# Patient Record
Sex: MALE | Race: WHITE
[De-identification: names, ages, dates, MRNs, and addresses within clinical notes are randomized per-mention and may not be internally consistent; named-entity substitution may affect disease eponyms.]

---

## 2017-02-01 ENCOUNTER — HOSPITAL ENCOUNTER (OUTPATIENT)
Dept: HOSPITAL 17 - CPRE | Age: 67
End: 2017-02-01
Attending: NEUROLOGICAL SURGERY
Payer: MEDICARE

## 2017-02-01 DIAGNOSIS — M12.88: ICD-10-CM

## 2017-02-01 DIAGNOSIS — M51.36: ICD-10-CM

## 2017-02-01 DIAGNOSIS — M48.06: Primary | ICD-10-CM

## 2017-02-03 NOTE — MH
cc:

CLEOPATRA BOLDEN M.D., ROHIT K. M.D. NIPPER MD,DALTON MCARTHUR M.D., MD

****

 

DATE OF ADMISSION:  2017

 

ADMITTING DIAGNOSIS:

Lumbar spinal stenosis.

 

HISTORY OF PRESENT ILLNESS

This is a 66-year-old male who presented to our office for an evaluation of

right groin pain.  He states that in 2016 he was on a trip out-of-state

and doing a lot of physical work weed whacking and twisting a lot.  He states

that two days after he returned, he had difficulty walking secondary to the

right groin pain which progressively has gotten worse. He went to the emergency

room for pain control but everything they gave him was not helping his pain. He

had an x-ray and ultrasound of his right groin and that was okay. He was

discharged with tramadol and muscle relaxants, which did not help.  He followed

up with his primary care physician was placed on prednisone, muscle relaxants

and Norco.  He states the pain resolved in July. On , the pain

returned but not to the same intensity.  His primary care doctor placed him on

prednisone again. He states the pain has changed and it is not in the groin so

much but is present in the mid right thigh to the mid right shin and around the

right knee. He states on the bottom of his feet, they feel like he is walking

on sand. He feels stuff crawling up his right calf. He denies any left lower

extremity symptoms. He denies much low back pain. Flexing his right hip

exacerbates his right groin pain. The patient has tried physical therapy which

helped some with his right groin pain. He wants to return to being a very

active lifestyle but is very concerned that the severe pain that he initially

had is going to return when he increases his activity.

The patient states that his pain is restricting his activity status.  He is

requesting that we proceed with surgical intervention.

 

PAST MEDICAL HISTORY:

1. 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

 

2. He also sees a rheumatologist for inflammatory arthritis.

3. He has a history of a torn rotator cuff on the left side in  that he had

surgery for.

4. Right thumb fracture in .

5. Tonsillectomy in .

 

 

CURRENT MEDICATIONS:

1. Arava daily.

2. Aleve daily and this was placed on hold prior to surgical intervention.

 

ALLERGIES:

HE HAS NO KNOWN DRUG ALLERGIES.

 

REVIEW OF SYSTEMS

CONSTITUTIONAL:  He denies any fever or chills.

EARS, NOSE AND THROAT:  No pharyngitis, exudates or bloody drainage from his

nose.

CARDIOVASCULAR:  He denies any chest pain or palpitations.

RESPIRATORY:  No cough or shortness of breath.

GENITOURINARY:  No dysuria or hematuria.

MUSCULOSKELETAL:  Positive for leg pain. No back pain.

SKIN:  No rashes or pruritus.

NEUROLOGIC:  No difficulty with speech or memory.

GASTROINTESTINAL:  No nausea or vomiting or abdominal pain.

PSYCHIATRIC:  No anxiety or depression symptoms.

ENDOCRINE:  No polyuria or polydipsia.

HEMATOLOGIC:  No bruising or bleeding tendencies.

 

FAMILY HISTORY:

His mother is  at 96 years of age old of natural causes. His father is

 at 81 years old and had a stroke.  He has a brother who is alive at 57

years old and in good health.

 

SOCIAL HISTORY:

He is retired.  He is .  He has two children.  He does not smoke and he

had previously but quit in  and at that time he was smoking a pack a day

for ten years.

 

PHYSICAL EXAMINATION

HEAD:  Normocephalic, atraumatic.

NECK:  Supple.  No carotid bruits heard on auscultation.

LUNGS:  Clear to auscultation bilaterally.

HEART:  Regular rate and rhythm.  Normal sinus S1-S2.

ABDOMEN:  Soft, nontender.  Positive bowel sounds.

SKIN:  Reveals no cyanosis or erythema.

MUSCULOSKELETAL: He has 4/5 right iliopsoas strength, otherwise his strength is

5/5 in the lower extremities.

NEUROLOGIC:  He is awake, alert and oriented.  Cranial nerves II through XII

appear grossly intact.  His speech is fluent.  Comprehension is good. Reflexes

are very diminished in the lower extremities.

 

DATA REVIEW:

An MRI of the lumbar spine from 2016 was reviewed which reveals

moderate to severe L3-L4 spinal stenosis from a combination of facet and

ligamentum flavum hypertrophy along with disk protrusion and moderate stenosis

at the L4-5 level and mild at the L2-3 level.  He has multilevel degenerative

disk disease with facet arthropathy.

 

IMPRESSION:

66-year-old male with persistent right groin and thigh pain down to his ankle

with numbness in the plantar aspect of his foot.  He also suffers from chronic

bilateral knee pain.  He has severe L3-4 and moderate L4-5 spinal stenosis

along with facet and ligamentum flavum hypertrophy and disc protrusions and

multilevel degenerative disc disease.

 

PLAN:

We have discussed the treatment options with the patient and he is requesting

that we proceed with surgical intervention.

 

We have recommended an L3-L5 lumbar decompression with microsurgical technique.

 

 

The procedure as well as the risks, benefit, alternatives and recovery time

were explained in great deal with the patient. We have discussed the risks

along with the surgery to include but not be limited to bleeding, infection,

muscle weakness, voice hoarseness, difficulty swallowing, heart attack, stroke,

blood clots, scar tissue formation among others.  The patient states that he

understands the procedure as well as the risks involved and he was therefore

scheduled accordingly.

 

Dictated by Anup Nicole PA-C

 

 

                               __________________________________

                           MD ALISA Paz/SYLVIA

D:  2/3/2017/1:13 PM

T:  2/3/2017/1:36 PM

Visit #:  [id number]

Job #:  [id number]

KAITLIN

## 2017-02-06 ENCOUNTER — HOSPITAL ENCOUNTER (OUTPATIENT)
Dept: HOSPITAL 17 - HSDC | Age: 67
Discharge: HOME | End: 2017-02-06
Attending: NEUROLOGICAL SURGERY
Payer: MEDICARE

## 2017-02-06 VITALS
SYSTOLIC BLOOD PRESSURE: 168 MMHG | OXYGEN SATURATION: 96 % | TEMPERATURE: 99 F | HEART RATE: 76 BPM | RESPIRATION RATE: 18 BRPM | DIASTOLIC BLOOD PRESSURE: 94 MMHG

## 2017-02-06 VITALS
OXYGEN SATURATION: 97 % | SYSTOLIC BLOOD PRESSURE: 145 MMHG | HEART RATE: 85 BPM | DIASTOLIC BLOOD PRESSURE: 82 MMHG | RESPIRATION RATE: 18 BRPM | TEMPERATURE: 98.1 F

## 2017-02-06 VITALS — BODY MASS INDEX: 27.81 KG/M2 | HEIGHT: 71 IN | WEIGHT: 198.64 LBS

## 2017-02-06 DIAGNOSIS — M48.06: Primary | ICD-10-CM

## 2017-02-06 DIAGNOSIS — M51.36: ICD-10-CM

## 2017-02-06 DIAGNOSIS — M12.88: ICD-10-CM

## 2017-02-06 PROCEDURE — 86850 RBC ANTIBODY SCREEN: CPT

## 2017-02-06 PROCEDURE — 86901 BLOOD TYPING SEROLOGIC RH(D): CPT

## 2017-02-06 PROCEDURE — 63048 LAM FACETEC &FORAMOT EA ADDL: CPT

## 2017-02-06 PROCEDURE — 72020 X-RAY EXAM OF SPINE 1 VIEW: CPT

## 2017-02-06 PROCEDURE — 86900 BLOOD TYPING SEROLOGIC ABO: CPT

## 2017-02-06 PROCEDURE — 76000 FLUOROSCOPY <1 HR PHYS/QHP: CPT

## 2017-02-06 PROCEDURE — 63047 LAM FACETEC & FORAMOT LUMBAR: CPT

## 2017-02-06 PROCEDURE — 00630 ANES PX LUMBAR REGION NOS: CPT

## 2017-02-06 NOTE — RADRPT
EXAM DATE/TIME:  02/06/2017 09:05 

 

[facility] COMPARISON:     

No previous studies available for comparison.

 

                     

INDICATIONS :     

L3-4, L4-5 Laminectomy.

                     

 

MEDICAL HISTORY :     

Gastroesophageal reflux disease.    Arrhythmia.   

 

SURGICAL HISTORY :     

None.   

 

ENCOUNTER:     

Initial                                        

 

ACUITY:     

1 day      

 

PAIN SCORE:     

Non-responsive.

 

LOCATION:     

Lumbar spine.

 

FINDINGS:     

Metallic probe is directed at L3-4 and L4-5.  

 

CONCLUSION:     Metallic probe directed at L3-4 and L4-5.  

 

 

 Gavino Hathaway MD FACR on February 06, 2017 at 13:04                

Board Certified Radiologist.

 This report was verified electronically.

## 2017-02-06 NOTE — PD.OP
Ubaldo Cedillo M.D. 


__________________________________________________





Operative Report


Date of Surgery:  Feb 6, 2017


Preoperative Diagnosis:  


Lumbar L3-4 and L4-5 spinal stenosis from combination of disc protrusion and 

facet hypertrophy; low back pain with neurogenic claudication and radiculopathy


Postoperative Diagnosis:  


Same


Procedure:


Lumbar L3, L4 and L5 decompressive laminectomies medial facetectomy, 

foraminotomies and microdiscectomy; microsurgical technique


Anesthesia:


Gen. endotracheal by Anneliese VILLAGOMEZ


Surgeon:


Torsten Carrillo M.D.


Assistant(s):


Verito Jolly


Operation and Findings:


Following administration of general endotracheal anesthesia, patient received 

vancomycin 1 g intravenously.  Sequential compression devices were placed for 

DVT prophylaxis.  He was then turned in prone position on Ghassan frame and the 

Harrison table and all pressure points adequately padded.  The lumbar region was 

then shaved and prepped with a Betadine and ChloraPrep.  Sterile draping 

undertaken with Ioban.  Midline incision overlying the L3-5 level was then made 

after infiltrating the skin with 0.5% Marcaine with epinephrine solution.  The 

skin incision was made extending down through the fascia and then using the 

subperiosteal plane on the right side the muscular attachments to the spinous 

process and lamina were detached.  Intraoperative fluoroscopy was used for 

level confirmation and further dissection undertaken using microtechnique with 

microscope magnification.  The inferior portion of the right L3, right L4 

hemilaminotomy and superior portion of the right L5 lamina were then drilled 

out and the underlying ligamentum flavum also removed.  There was facet 

arthropathy noted and the medial portion of facets at the right L3-4 and L4-5 

levels was also resected and the lateral recess along with a foramen 

decompressed.  Epidural venous stasis which he with the bipolar cautery along 

with Gelfoam and thrombin and bone wax used at the laminotomy edges for 

hemostasis.  The thecal sac was then gently retracted with a nerve root 

retractor and an extruded disc fragment was identified at the L3-4 level.  

Fragments were removed with pituitary forceps and the nerve root impingement 

along with thecal sac compression decompressed.  The area was then copiously 

irrigated with vancomycin solution.  The retractors removed and the muscle 

fascia proximal using 2-0 Vicryl interrupted stitches.  3-0 Vicryl subcuticular 

stitches were also placed in an interrupted fashion and planned skin closure 

was with Mastisol and Steri-Strips.  A sterile dressing was then applied and 

the patient then turned in the supine position and extubated and taken to 

recovery room in stable condition.  There were no intraoperative complications 

and all sponge and needle count was correct at the end of the procedure.  

Estimated blood loss about 50 ml.








Torsten Carrillo MD Feb 6, 2017 11:33

## 2018-01-15 ENCOUNTER — HOSPITAL ENCOUNTER (OUTPATIENT)
Dept: HOSPITAL 17 - CPRE | Age: 68
End: 2018-01-15
Attending: SURGERY
Payer: MEDICARE

## 2018-01-15 DIAGNOSIS — Z01.810: ICD-10-CM

## 2018-01-15 DIAGNOSIS — R94.31: ICD-10-CM

## 2018-01-15 DIAGNOSIS — Z01.812: Primary | ICD-10-CM

## 2018-01-15 LAB
ALBUMIN: 4 GM/DL (ref 3.4–5)
ALKALINE PHOSPHATASE: 66 U/L (ref 45–117)
ALT (GPT): 39 U/L (ref 12–78)
ANION GAP: 7 MEQ/L (ref 5–15)
APTT (PATIENT): 23.7 SEC (ref 24.3–30.1)
AST (GOT): 28 U/L (ref 15–37)
BICARBONATE: 27.4 MEQ/L (ref 21–32)
BILIRUB SERPL-MCNC: 1.2 MG/DL (ref 0.2–1)
BLOOD UREA NITROGEN: 16 MG/DL (ref 7–18)
BLOOD, URINE: (no result)
CALCIUM: 9 MG/DL (ref 8.5–10.1)
CHLORIDE: 103 MEQ/L (ref 98–107)
COMMENT (UR): (no result)
CREATININE: 0.9 MG/DL (ref 0.6–1.3)
CULTURE IF INDICATED: (no result)
GLOMERULAR FILTRATION RATE: 84 ML/MIN (ref 89–?)
GLUCOSE,URINE: (no result) MG/DL
HEMATOCRIT: 43.7 % (ref 39–51)
HEMOGLOBIN: 15.6 GM/DL (ref 13–17)
INTERNATIONAL NORMALIZED RATIO: 0.9 RATIO
KETONE, URINE: (no result) MG/DL
MEAN CELL VOLUME: 89 FL (ref 80–100)
MEAN CORPUSCULAR HEMOGLOBIN: 31.8 PG (ref 27–34)
MEAN CORPUSCULAR HGB CONC: 35.7 % (ref 32–36)
MEAN PLATELET VOLUME: 8.3 FL (ref 7–11)
MUCUS URINE: (no result) /LPF
NITRITE,URINE: (no result)
PLATELET COUNT: 163 TH/MM3 (ref 150–450)
POTASSIUM: 4.3 MEQ/L (ref 3.5–5.1)
PROTHROMBIN TIME - PATIENT: 9.6 SEC (ref 9.8–11.6)
RED BLOOD COUNT: 4.9 MIL/MM3 (ref 4.5–5.9)
RED CELL DISTRIBUTION WIDTH: 13.5 % (ref 11.6–17.2)
REVIEW FLAG: (no result)
SODIUM (NA): 137 MEQ/L (ref 136–145)
SPECIFIC GRAVITY,URINE: 1.02 (ref 1–1.03)
TOTAL PROTEIN: 7.3 GM/DL (ref 6.4–8.2)
URINE COLOR: YELLOW
URINE LEUKOCYTE ESTERASE: (no result)
WHITE BLOOD COUNT: 5.5 TH/MM3 (ref 4–11)

## 2018-01-15 PROCEDURE — 85610 PROTHROMBIN TIME: CPT

## 2018-01-15 PROCEDURE — 93005 ELECTROCARDIOGRAM TRACING: CPT

## 2018-01-15 PROCEDURE — 81001 URINALYSIS AUTO W/SCOPE: CPT

## 2018-01-15 PROCEDURE — 80053 COMPREHEN METABOLIC PANEL: CPT

## 2018-01-15 PROCEDURE — 36415 COLL VENOUS BLD VENIPUNCTURE: CPT

## 2018-01-15 PROCEDURE — 85027 COMPLETE CBC AUTOMATED: CPT

## 2018-01-15 PROCEDURE — 85730 THROMBOPLASTIN TIME PARTIAL: CPT

## 2018-01-17 NOTE — MH
cc:

JEVON CANO M.D.

****

 

DATE OF ADMISSION

1/22/2018

 

ADMISSION DIAGNOSIS

Osteoarthritic degeneration of left and right knees now being admitted for

bilateral total knee arthroplasties.

 

HISTORY OF THE PRESENT ILLNESS

Admission history and physical is as follows, this pleasant 67-year-old male is

being admitted today for bilateral total knee arthroplasty due to severe

painful osteoarthritic degeneration of his knees.

 

PAST MEDICAL HISTORY

Other past history:

1. He has a history of respiratory tuberculosis when he was younger.

2. And arthritis.

 

CURRENT MEDICATIONS

Arava 20 mg a day.

 

REVIEW OF SYSTEMS

Noncontributory.

 

FAMILY HISTORY

Noncontributory.

 

PAST SURGICAL HISTORY

No known surgical history.

 

SOCIAL HISTORY

He does not smoke or drink.

 

ALLERGIES

NO KNOWN ALLERGIES.

 

PHYSICAL EXAMINATION

GENERAL: We find a 67-year-old male well-developed, well-nourished, alert and

oriented times three complaining of pain in his knees.

VITAL SIGNS: Blood pressure 138/78, pulse 69 and regular, respirations 16,

temperature 98.5, pulse oximetry 97% on room air.

HEENT:  Eyes PERRL, EOMI.  Ears, nose, mouth clear.

NECK: Supple.

LUNGS: Clear.

HEART:  Regular rate.

ABDOMEN:  Soft.  Positive bowel sounds, nontender.

EXTREMITIES: Reveal both knees to have crepitance on range of motion.  He is

neurovascularly intact to his toes.

 

IMPRESSION

At this time is severe, painful, arthritic degeneration of both knees.

 

PLAN

Admission for bilateral total knee arthroplasty today.  The patient given

prescription for postoperative pain and anticoagulation control in the office.

 

 

 

 

                               __________________________________

                           MD ROD Mullen/ELIS

D:  1/17/2018/5:15 PM

T:  1/17/2018/5:50 PM

Visit #:  [id number]

Job #:  [id number]

## 2018-01-22 ENCOUNTER — HOSPITAL ENCOUNTER (INPATIENT)
Dept: HOSPITAL 17 - HSDI | Age: 68
LOS: 2 days | Discharge: NURSING, REHAB OR LONG-TERM CARE | DRG: 462 | End: 2018-01-24
Attending: SURGERY | Admitting: SURGERY
Payer: MEDICARE

## 2018-01-22 VITALS
HEART RATE: 96 BPM | OXYGEN SATURATION: 95 % | SYSTOLIC BLOOD PRESSURE: 132 MMHG | TEMPERATURE: 96.2 F | DIASTOLIC BLOOD PRESSURE: 73 MMHG | RESPIRATION RATE: 17 BRPM

## 2018-01-22 VITALS
RESPIRATION RATE: 18 BRPM | TEMPERATURE: 98.3 F | OXYGEN SATURATION: 97 % | HEART RATE: 125 BPM | SYSTOLIC BLOOD PRESSURE: 139 MMHG | DIASTOLIC BLOOD PRESSURE: 80 MMHG

## 2018-01-22 VITALS — HEIGHT: 71 IN | BODY MASS INDEX: 28.73 KG/M2 | WEIGHT: 205.25 LBS

## 2018-01-22 VITALS — HEART RATE: 105 BPM

## 2018-01-22 DIAGNOSIS — I10: ICD-10-CM

## 2018-01-22 DIAGNOSIS — Z88.6: ICD-10-CM

## 2018-01-22 DIAGNOSIS — K21.9: ICD-10-CM

## 2018-01-22 DIAGNOSIS — M17.0: Primary | ICD-10-CM

## 2018-01-22 DIAGNOSIS — Z87.891: ICD-10-CM

## 2018-01-22 DIAGNOSIS — M54.5: ICD-10-CM

## 2018-01-22 DIAGNOSIS — Z86.11: ICD-10-CM

## 2018-01-22 DIAGNOSIS — G89.29: ICD-10-CM

## 2018-01-22 PROCEDURE — 76937 US GUIDE VASCULAR ACCESS: CPT

## 2018-01-22 PROCEDURE — 0SRC0J9 REPLACEMENT OF RIGHT KNEE JOINT WITH SYNTHETIC SUBSTITUTE, CEMENTED, OPEN APPROACH: ICD-10-PCS | Performed by: SURGERY

## 2018-01-22 PROCEDURE — C1776 JOINT DEVICE (IMPLANTABLE): HCPCS

## 2018-01-22 PROCEDURE — 86900 BLOOD TYPING SEROLOGIC ABO: CPT

## 2018-01-22 PROCEDURE — 85014 HEMATOCRIT: CPT

## 2018-01-22 PROCEDURE — 86850 RBC ANTIBODY SCREEN: CPT

## 2018-01-22 PROCEDURE — 85018 HEMOGLOBIN: CPT

## 2018-01-22 PROCEDURE — 86901 BLOOD TYPING SEROLOGIC RH(D): CPT

## 2018-01-22 PROCEDURE — 3E0T3BZ INTRODUCTION OF ANESTHETIC AGENT INTO PERIPHERAL NERVES AND PLEXI, PERCUTANEOUS APPROACH: ICD-10-PCS

## 2018-01-22 PROCEDURE — L1830 KO IMMOB CANVAS LONG PRE OTS: HCPCS

## 2018-01-22 PROCEDURE — 0SRD0J9 REPLACEMENT OF LEFT KNEE JOINT WITH SYNTHETIC SUBSTITUTE, CEMENTED, OPEN APPROACH: ICD-10-PCS | Performed by: SURGERY

## 2018-01-22 PROCEDURE — C9290 INJ, BUPIVACAINE LIPOSOME: HCPCS

## 2018-01-22 PROCEDURE — 73560 X-RAY EXAM OF KNEE 1 OR 2: CPT

## 2018-01-22 PROCEDURE — 94150 VITAL CAPACITY TEST: CPT

## 2018-01-22 RX ADMIN — Medication SCH TAB: at 17:29

## 2018-01-22 RX ADMIN — OXYTOCIN SCH MLS/HR: 10 INJECTION, SOLUTION INTRAMUSCULAR; INTRAVENOUS at 13:00

## 2018-01-22 RX ADMIN — VITAMIN D, TAB 1000IU (100/BT) SCH UNITS: 25 TAB at 17:29

## 2018-01-22 RX ADMIN — OXYTOCIN SCH MLS/HR: 10 INJECTION, SOLUTION INTRAMUSCULAR; INTRAVENOUS at 22:40

## 2018-01-22 NOTE — PD.CONS
HPI


Service


[facility]


Consult Requested By


Dr. Louise


Reason for Consult


Postoperative medical management


Primary Care Physician


Marcial Chou M.D.


Diagnoses:  


History of Present Illness


This is 67-year-old male patient with past medical history which includes GERD, 

hypertension, chronic lower back pain and osteoarthritis bilateral knees.  

Patient underwent a bilateral total knee arthroplasty 1/22/2018 with Dr. Louise 

to be been consulted for assistance with postoperative medical management.  

Patient seen postoperatively.  He is groggy post anesthesia information 

gathered from patient and prior charting.  Patient offers no specific 

complaints at this time.  Patient reports pain is tolerable.  Denies shortness 

of breath chest pain.





Review of Systems


ROS Limitations:  Clinical Condition


Constitutional:  COMPLAINS OF: Fatigue, DENIES: Fever, Chills


Eyes:  DENIES: Blurred vision, Diplopia, Vision loss


Respiratory:  DENIES: Cough, Sputum production, Shortness of breath


Cardiovascular:  DENIES: Chest pain, Palpitations, Dyspnea on Exertion


Gastrointestinal:  DENIES: Abdominal pain, Nausea, Vomiting


Musculoskeletal:  COMPLAINS OF: Joint pain, Joint Swelling


Neurologic:  COMPLAINS OF: Abnormal gait, DENIES: Headache, Localized weakness, 

Speech Problems


Psychiatric:  DENIES: Anxiety, Confusion, Depression





Past Family Social History


Past Medical History


GERD, hypertension, chronic lower back pain and osteoarthritis bilateral knees


Past Surgical History


Complete colonoscopy, EGD, excision of eyelid lesion, rotator cuff surgery, 

tonsillectomy, left knee arthroplasty


Reported Medications


Aleve Arthritis (Naproxen Sodium) 220 Mg Tab 220 Mg PO BID PRN


Preservision Areds (Multiple Vitamins W/ Minerals) 1 Tab 1 Tab PO DAILY


Vitamin D3 (Cholecalciferol) 1,000 Unit Tab 5,000 Units PO DAILY


Multiple Vitamin 1 Tab 1 Tab PO DAILY


Allergies:  


Coded Allergies:  


     acetaminophen (Unverified  Allergy, Severe, RESTLESSNESS, 1/22/18)


     monosodium glutamate (Unverified  Allergy, Severe, HEADACHES, 1/22/18)


     propoxyphene (Unverified  Allergy, Severe, RESTLESSNESS, 1/22/18)


Active Ordered Medications





Current Medications








 Medications


  (Trade)  Dose


 Ordered  Sig/Mara


 Route  Start Time


 Stop Time Status Last Admin


 


 Lactated Ringer's  1,000 ml @ 


 30 mls/hr  Q24H PRN


 IV  1/22/18 06:00


 1/25/18 05:59  1/22/18 06:30


 


 


 Sodium Chloride  500 ml @ 


 30 mls/hr  [id number] PRN


 IV  1/22/18 06:00


 1/25/18 05:59   


 


 


  (Lopressor)  25 mg  ON CALL  PRN


 PO  1/22/18 06:00


 1/25/18 05:59   


 


 


  (Betadine 5%


 Antisepsis Kit)  1 applic  ON CALL  PRN


 EACH NARE  1/22/18 06:00


 1/25/18 05:59   


 


 


  (Chlorhexidine


 2% Cloth)  3 pack  ON CALL  PRN


 TOPICAL  1/22/18 06:00


 1/25/18 05:59  1/22/18 06:30


 


 


  (Hibiclens 4%


 Top Soln)  1 applic  ONCE


 TOPICAL  1/22/18 06:00


 1/25/18 05:59  1/22/18 06:30


 


 


 Cefazolin Sodium/


 Dextrose  50 ml @ 


 100 mls/hr  ON  CALL


 IV  1/22/18 06:00


 1/25/18 05:59  1/22/18 08:11


 


 


 Vancomycin HCl


 1000 mg/Sodium


 Chloride  250 ml @ 


 250 mls/hr  ON  CALL


 IV  1/22/18 06:00


 1/25/18 05:59  1/22/18 07:30


 


 


 Tranexamic Acid


 930 mg/Sodium


 Chloride  109.3 ml @ 


 200 mls/hr  ONCE


 IV  1/22/18 06:00


 1/22/18 16:00  1/22/18 08:12


 


 


 Tranexamic Acid


 930 mg/Sodium


 Chloride  109.3 ml @ 


 200 mls/hr  ONCE


 IV  1/22/18 06:00


 1/22/18 16:00  1/22/18 11:10


 


 


 Bupivacaine


 Liposome 20 ml/


 Sodium Chloride  120 ml @ 


 240 mls/hr  ONCE


 P-ARTICULR  1/22/18 06:15


 1/22/18 16:00  1/22/18 10:11


 


 


  (Vitamin D3)  5,000 units  DAILY


 PO  1/22/18 09:00


     


 


 


 Non-Formulary


 Medication  1 tab  DAILY


 PO  1/22/18 09:00


   UNV  


 


 


 Non-Formulary


 Medication  1 tab  DAILY


 PO  1/22/18 09:00


   UNV  


 


 


 Lactated Ringer's  1,000 ml @ 


 80 mls/hr  [id number]


 IV  1/22/18 07:50


    1/22/18 13:00


 


 


 Cefazolin Sodium


 1000 mg/Sodium


 Chloride  100 ml @ 


 200 mls/hr  Q6H


 IV  1/22/18 08:00


 1/22/18 20:29 UNV  


 


 


  (Post-op Orders


  (for Pharmacy))    STAT  ONCE


 XX  1/22/18 08:00


 1/22/18 08:01 UNV  


 


 


 Tranexamic Acid /


 Sodium Chloride  100 ml @ 


 200 mls/hr  UNSCH


 IV  1/22/18 08:00


 1/22/18 08:29 UNV  


 


 


  (Theragran M Tab)  1 tab  BID


 PO  1/23/18 21:00


 3/24/18 20:59   


 


 


  (Zofran Inj)  4 mg  Q6H  PRN


 IVP  1/22/18 08:00


     


 


 


  (Colace)  100 mg  BID


 PO  1/23/18 21:00


     


 


 


  (Restoril)  15 mg  HS  PRN


 PO  1/22/18 08:00


     


 


 


  (Bacitracin Oint


 Packet)  0.9 gm  UNSCH X1  PRN


 TOP  1/24/18 10:15


 1/26/18 10:14   


 


 


  (Narcan Inj)  0.4 mg  UNSCH  PRN


 IV PUSH  1/22/18 08:00


     


 


 


  (Benadryl Inj)  25 mg  Q6H  PRN


 IV PUSH  1/22/18 08:00


     


 


 


  (Morphine 1 Mg/


 ml PCA)  30 mg  UNSCH


 IV  1/22/18 08:00


     


 


 


 PCA Dosage


 Infused (Pha)  1  Q8HR


 .XX  1/22/18 14:00


     


 


 


  (Eliquis)  2.5 mg  BID


 PO  1/23/18 10:00


   UNV  


 


 


  (Vicoprofen


 7.5-200 Mg)  2 tab  Q4H  PRN


 PO  1/22/18 08:00


     


 


 


  (Vicoprofen


 7.5-200 Mg)  1 tab  Q4H  PRN


 PO  1/22/18 08:00


     


 








Family History


Reviewed and noncontributory


Social History


Currently 


Denies EtOH use or illicit drug use


Quit smoking cigarettes 1992





Physical Exam


Vital Signs





Vital Signs








  Date Time  Temp Pulse Resp B/P (MAP) Pulse Ox O2 Delivery O2 Flow Rate FiO2


 


1/22/18 12:59   15     


 


1/22/18 12:15 97.5 85 20 121/71 (88) 96 Nasal Cannula 3 


 


1/22/18 06:28 98.2 77 16 162/85 (110) 96   








Physical Exam


GENERAL: This is a well-nourished, well-developed patient, in no apparent 

distress.


SKIN: Post-op dressing dry and intact


HEAD: Atraumatic. Normocephalic. No temporal or scalp tenderness.


EYES: Extraocular motions intact. No scleral icterus. No injection or drainage. 


CARDIOVASCULAR: Regular rate and rhythm 


RESPIRATORY: Clear to auscultation. Breath sounds equal bilaterally. .  


GASTROINTESTINAL: Abdomen soft, non-tender, nondistended. 


MUSCULOSKELETAL: Extremities without clubbing, cyanosis, or edema. No joint 

tenderness, effusion, or edema noted. No calf tenderness. Negative Homans sign 

bilaterally.


NEUROLOGICAL: Awake and alert. Sensory grossly within normal limits. Five out 

of 5 muscle strength in all muscle groups, with the exception of postoperative 

bilateral lower extremities.  Normal speech.





Assessment and Plan


Problem List:  


(1) Osteoarthritis of knees, bilateral


ICD Codes:  M17.0 - Bilateral primary osteoarthritis of knee


Plan:  


Osteoarthritis bilateral knees


Patient is status post bilateral total knee arthroplasty today 1/22/2019 with 

Dr. Louise


Postoperative pain management includes


Morphine PCA


Postoperative anticoagulation per orthopedic surgery





Hypertension not on medication


monitor blood pressure





Assessment and Plan


Patient examined.


Assessment and plan formulated with Erin Blue PA-C.


I agree with the above.


medically stable.











Erin Blue Jan 22, 2018 13:31


Kwame Villatoro MD Jan 22, 2018 17:08

## 2018-01-22 NOTE — RADRPT
EXAM DATE/TIME:  01/22/2018 12:52 

 

[facility] COMPARISON:     No previous studies available for comparison.

 

                     

INDICATIONS :     Post-op left knee.

                     

MEDICAL HISTORY :     None.          

SURGICAL HISTORY :     Total knee replacement, left. Total knee replacement, right.  

ENCOUNTER:     Initial                                        

ACUITY:     1 day      

PAIN SCORE:     10/10

LOCATION:     Left  Knee

 

FINDINGS:     

AP and lateral views of the knee following arthroplasty reveals a prosthesis in anatomic alignment. F
racture is not appreciated. 

 

CONCLUSION:  Status post total knee arthroplasty.

 

Gavino Hathaway MD  FACR     

Board Certified Radiologist.

 This report was verified electronically.

## 2018-01-22 NOTE — RADRPT
EXAM DATE/TIME:  01/22/2018 12:44 

 

[facility] COMPARISON:     No previous studies available for comparison.

 

                     

INDICATIONS :     Post-op right knee.

                     

MEDICAL HISTORY :     None.          

SURGICAL HISTORY :     Total knee replacement, left. Total knee replacement, right.  

ENCOUNTER:     Initial                                        

ACUITY:     1 day      

PAIN SCORE:     10/10

LOCATION:     Right  Knee

 

FINDINGS:     

AP and lateral views of the knee following arthroplasty reveals a prosthesis in anatomic alignment. F
racture is not appreciated. 

 

CONCLUSION:  Status post total knee arthroplasty.

 

Gavino Hathaway MD  FACR     

Board Certified Radiologist.

 This report was verified electronically.

## 2018-01-23 VITALS
TEMPERATURE: 98.9 F | SYSTOLIC BLOOD PRESSURE: 160 MMHG | RESPIRATION RATE: 18 BRPM | DIASTOLIC BLOOD PRESSURE: 77 MMHG | HEART RATE: 98 BPM | OXYGEN SATURATION: 98 %

## 2018-01-23 VITALS
SYSTOLIC BLOOD PRESSURE: 127 MMHG | HEART RATE: 96 BPM | RESPIRATION RATE: 18 BRPM | OXYGEN SATURATION: 95 % | TEMPERATURE: 97.6 F | DIASTOLIC BLOOD PRESSURE: 68 MMHG

## 2018-01-23 VITALS
HEART RATE: 85 BPM | SYSTOLIC BLOOD PRESSURE: 129 MMHG | RESPIRATION RATE: 17 BRPM | DIASTOLIC BLOOD PRESSURE: 59 MMHG | TEMPERATURE: 98.8 F | OXYGEN SATURATION: 96 %

## 2018-01-23 VITALS
RESPIRATION RATE: 18 BRPM | OXYGEN SATURATION: 97 % | HEART RATE: 90 BPM | SYSTOLIC BLOOD PRESSURE: 111 MMHG | DIASTOLIC BLOOD PRESSURE: 72 MMHG | TEMPERATURE: 99.2 F

## 2018-01-23 VITALS
TEMPERATURE: 98 F | DIASTOLIC BLOOD PRESSURE: 79 MMHG | HEART RATE: 98 BPM | SYSTOLIC BLOOD PRESSURE: 143 MMHG | RESPIRATION RATE: 18 BRPM | OXYGEN SATURATION: 96 %

## 2018-01-23 VITALS
SYSTOLIC BLOOD PRESSURE: 137 MMHG | HEART RATE: 102 BPM | TEMPERATURE: 99.6 F | DIASTOLIC BLOOD PRESSURE: 79 MMHG | RESPIRATION RATE: 19 BRPM | OXYGEN SATURATION: 93 %

## 2018-01-23 VITALS — OXYGEN SATURATION: 93 %

## 2018-01-23 LAB
HCT VFR BLD CALC: 35.3 % (ref 39–51)
HGB BLD-MCNC: 12.3 GM/DL (ref 13–17)

## 2018-01-23 RX ADMIN — HYDROCODONE BITARTRATE AND IBUPROFEN PRN TAB: 7.5; 2 TABLET ORAL at 21:02

## 2018-01-23 RX ADMIN — MULTIPLE VITAMINS W/ MINERALS TAB SCH TAB: TAB at 21:00

## 2018-01-23 RX ADMIN — OXYTOCIN SCH MLS/HR: 10 INJECTION, SOLUTION INTRAMUSCULAR; INTRAVENOUS at 08:09

## 2018-01-23 RX ADMIN — APIXABAN SCH MG: 2.5 TABLET, FILM COATED ORAL at 21:00

## 2018-01-23 RX ADMIN — OXYTOCIN SCH MLS/HR: 10 INJECTION, SOLUTION INTRAMUSCULAR; INTRAVENOUS at 21:20

## 2018-01-23 RX ADMIN — VITAMIN D, TAB 1000IU (100/BT) SCH UNITS: 25 TAB at 08:06

## 2018-01-23 RX ADMIN — DOCUSATE SODIUM SCH MG: 100 CAPSULE, LIQUID FILLED ORAL at 21:00

## 2018-01-23 RX ADMIN — Medication SCH TAB: at 08:06

## 2018-01-23 RX ADMIN — APIXABAN SCH MG: 2.5 TABLET, FILM COATED ORAL at 10:00

## 2018-01-23 NOTE — PD.ORT.PN
Subjective


Subjective Remarks


Patient fairly comfortable today. Having some difficulty voiding while in bed. 

Better when sitting.





Objective


Vitals





Vital Signs








  Date Time  Temp Pulse Resp B/P (MAP) Pulse Ox O2 Delivery O2 Flow Rate FiO2


 


1/23/18 09:50     93   21


 


1/23/18 08:00 98.0 98 18 143/79 (100) 96   


 


1/23/18 05:55   18     


 


1/23/18 04:08 99.2 90 18 111/72 (85) 97   


 


1/23/18 00:10 97.6 96 18 127/68 (87) 95   


 


1/22/18 22:25  105      


 


1/22/18 22:00   18     


 


1/22/18 20:40 98.3 125 18 139/80 (99) 97   


 


1/22/18 17:29   17     


 


1/22/18 16:30 96.2 96 17 132/73 (92) 95   


 


1/22/18 16:00 97.8 98 16 123/64 (83) 96 Nasal Cannula 2 


 


1/22/18 15:00  99 16 126/68 (87) 95 Nasal Cannula 2 


 


1/22/18 14:00  98 16 122/66 (84) 94 Nasal Cannula 2 


 


1/22/18 13:36   15     


 


1/22/18 13:31   15     


 


1/22/18 13:30 97.8 96 15 118/67 (84) 93 Nasal Cannula 2 


 


1/22/18 13:15  98 15 120/68 (85) 93 Nasal Cannula 2 


 


1/22/18 13:00  93 15 119/69 (86) 100 Nasal Cannula 3 


 


1/22/18 12:59   15     


 


1/22/18 12:45  88 15 120/75 (90) 98 Nasal Cannula 3 


 


1/22/18 12:30  89 15 119/70 (86) 97 Nasal Cannula 3 


 


1/22/18 12:15 97.5 85 20 121/71 (88) 96 Nasal Cannula 3 














I/O      


 


 1/22/18 1/22/18 1/22/18 1/23/18 1/23/18 1/23/18





 07:00 15:00 23:00 07:00 15:00 23:00


 


Intake Total  2200 ml 450 ml 1667 ml  


 


Output Total  6200 ml 200 ml 600 ml  


 


Balance  -4000 ml 250 ml 1067 ml  


 


      


 


Intake Oral   240 ml 240 ml  


 


IV Total  2200 ml 210 ml 1427 ml  


 


Output Urine Total   200 ml 600 ml  


 


Estimated Blood Loss  200 ml    


 


Other  6000 ml    


 


# Voids   0   


 


# Bowel Movements   0 0  








Result Diagram:  


1/23/18 0617





Objective Remarks


sitting up in chair. NV intact to toes. No calf tenderness.





Assessment & Plan


Ortho Post Op Day #:  1


Problem List:  


Assessment and Plan


Cont PT, [facility] soon.











JEVON Louise MD Jan 23, 2018 11:24

## 2018-01-23 NOTE — MP
cc:

JEVON CANO

****

 

 

DATE OF SURGERY

1/22/2018

 

PREOPERATIVE DIAGNOSIS

Osteoarthritic degeneration both right and left knees.

 

POSTOPERATIVE DIAGNOSIS

Osteoarthritic degeneration both right and left knees.

 

SURGERY PERFORMED

Bilateral total knee arthroplasties using consensus components, the right knee,

size 5 femur, 4 tibia, 2 patella with a 10 insert, the left knee being a size 5

femur, 3 tibia, 2 patella and a 10 insert.  Two batches of antibiotic

impregnated cement for each knee of DePuy cement.

 

SURGEON

Dr. Cano

 

ASSISTANT

DONNY Marshall

 

ANESTHESIA

General intubation and blocks

 

PROCEDURE WAS AS FOLLOWS

After successful induction of anesthesia, the patient is placed on the

operating room table in the supine position. The knee is prepped and draped in

the usual manner. A tourniquet is inflated at the upper thigh and set to 300

mmHg pressure after exsanguination of the lower extremity.

 

A longitudinal incision is made extending from 3 inches proximal to the

superior pole of the patella, across the patella in longitudinal fashion, and

down past the insertion of the tibial tubercle into the proximal tibia. The

incision is carried down through subcutaneous tissue along the medial aspect of

the patella and retinaculum, down through the capsule to expose the knee joint.

The patella and patellar tendon are freed up enough to allow the patella to be

inverted and retracted off the lateral side of the knee joint. The knee joint

is left exposed. Small osteophytes are removed. All soft tissue is removed to

allow proper position of the femoral and tibial cutting jig guide.

The first femoral jig is then inserted along the distal end of the femur after

first measuring to decide whether this is a small, medium, or large component.

The notch is then drilled and the tibial cutting guide inserted into the

femoral cutting guide, along with the ankle brace to allow for proper

measurement of the tibial cutting surface that needed to be resected. Pins are

inserted into the tibial cutting jig and femoral cutting jig to hold them in

place. An oscillating saw is then used to resect the surface of the tibia. The

surface of the tibia is then completely removed using sharp and blunt

dissection. The anterior and posterior cuts of the femur are then made as well

using an oscillating saw through the cutting guide. All guides are then removed

and the varus/valgus angulation cutting guide applied to the femur for proper

measurement of the proper amount of valgus. The anterior cutting guide for the

femur is then inserted at the anterior femoral cuts made. Next, the first block

trial is inserted into the femur to allow for proper condyle drill holes to be

made which are then made followed by removal of the bone between the condyles

using an oscillating saw as well as the bone removed at the most posterior

surface of the condyle. After this, this guide is removed and the chamfer cuts

made using the chamfer cutting guide from both anterior and posterior. Next,

the femoral trial is then inserted, the tibial surface reflected anterior to

expose the tibial surface and a tibial stem guide is inserted after first

measuring for a standard, standard plus, large, or large plus surface to be

used. After the stem is impacted the trial tibial surface is applied followed

by the trial meniscal components. After full range of motion is found with the

appropriate length meniscal components varying the patella is prepared by

resecting the posterior aspect of the patella using an oscillating saw,

inserting a trial. The trial is then removed and the cruciate cutting guide

applied using the bur to cut the cruciate cuts. After cruciate cuts are made

all trials are removed. The wound is irrigated copiously with antibiotic

solution and Water-Pik and the actual components inserted into place using the

aforementioned components, the right knee being done first.

 

After the cement has hardened and the components are found to have full range

of motion with no instability.  Tourniquet deflated total tourniquet time being

46 minutes at 300 mmHg pressure.  Deep fascia approximated with running #2

quill after approximated after 60 cc of Exparel inserted around the knee joint

for extra pain control.  Deep fascia approximated running #2 quill,

subcutaneous tissue approximated using interrupted running 2-0 and 3-0 Monocryl

suture and Steri-Strips, sterile dressing and knee immobilizer.

 

The left knee.  Tourniquet deflated total tourniquet time being 55 minutes at

300 mmHg pressure.  Meticulous hemostasis achieved. 60 cc of Exparel used

around the knee joint for extra pain control.  Deep fascia approximated using

#2 quill, subcutaneous tissue approximated using interrupted and running 2-0

and 3-0 Monocryl sutures.  Steri-Strips, sterile dressing and knee immobilizer.

No drains utilized.

 

Estimated blood loss for both knees together 200 cc.

 

Sponge and suture counts were correct.

 

The patient tolerated the procedure well and left the operating room in

satisfactory condition.

 

Joanne Piña, ARNP was present during the entire procedure to include patient

positioning and the procedure.  The medical necessity of a nurse practitioner

as a first assistant was indicated in this case due to the surgical complexity

of the case itself and during the surgical case, the surgical tech was working

the back table while my surgical first assistant, DONNY, was directly assisting

me.

 

 

 

                              _________________________________

                              J. MD ROD Casarez/TRISH

D:  1/22/2018/11:42 AM

T:  1/23/2018/9:32 AM

Visit #:  [id number]

Job #:  [id number]

## 2018-01-24 VITALS
RESPIRATION RATE: 17 BRPM | DIASTOLIC BLOOD PRESSURE: 76 MMHG | SYSTOLIC BLOOD PRESSURE: 127 MMHG | HEART RATE: 89 BPM | TEMPERATURE: 98.9 F | OXYGEN SATURATION: 97 %

## 2018-01-24 VITALS — HEART RATE: 76 BPM | SYSTOLIC BLOOD PRESSURE: 161 MMHG | DIASTOLIC BLOOD PRESSURE: 77 MMHG | OXYGEN SATURATION: 100 %

## 2018-01-24 VITALS
RESPIRATION RATE: 18 BRPM | OXYGEN SATURATION: 97 % | HEART RATE: 102 BPM | TEMPERATURE: 99.6 F | SYSTOLIC BLOOD PRESSURE: 136 MMHG | DIASTOLIC BLOOD PRESSURE: 81 MMHG

## 2018-01-24 VITALS
SYSTOLIC BLOOD PRESSURE: 134 MMHG | HEART RATE: 88 BPM | DIASTOLIC BLOOD PRESSURE: 78 MMHG | RESPIRATION RATE: 17 BRPM | TEMPERATURE: 98.8 F | OXYGEN SATURATION: 100 %

## 2018-01-24 VITALS — OXYGEN SATURATION: 98 %

## 2018-01-24 VITALS
HEART RATE: 79 BPM | OXYGEN SATURATION: 100 % | RESPIRATION RATE: 17 BRPM | TEMPERATURE: 97.3 F | SYSTOLIC BLOOD PRESSURE: 145 MMHG | DIASTOLIC BLOOD PRESSURE: 69 MMHG

## 2018-01-24 LAB
HCT VFR BLD CALC: 30.8 % (ref 39–51)
HGB BLD-MCNC: 11 GM/DL (ref 13–17)

## 2018-01-24 RX ADMIN — Medication SCH TAB: at 08:40

## 2018-01-24 RX ADMIN — APIXABAN SCH MG: 2.5 TABLET, FILM COATED ORAL at 08:40

## 2018-01-24 RX ADMIN — HYDROCODONE BITARTRATE AND IBUPROFEN PRN TAB: 7.5; 2 TABLET ORAL at 01:01

## 2018-01-24 RX ADMIN — MULTIPLE VITAMINS W/ MINERALS TAB SCH TAB: TAB at 08:40

## 2018-01-24 RX ADMIN — OXYTOCIN SCH MLS/HR: 10 INJECTION, SOLUTION INTRAMUSCULAR; INTRAVENOUS at 09:50

## 2018-01-24 RX ADMIN — HYDROCODONE BITARTRATE AND IBUPROFEN PRN TAB: 7.5; 2 TABLET ORAL at 08:41

## 2018-01-24 RX ADMIN — HYDROCODONE BITARTRATE AND IBUPROFEN PRN TAB: 7.5; 2 TABLET ORAL at 13:48

## 2018-01-24 RX ADMIN — DOCUSATE SODIUM SCH MG: 100 CAPSULE, LIQUID FILLED ORAL at 08:39

## 2018-01-24 RX ADMIN — VITAMIN D, TAB 1000IU (100/BT) SCH UNITS: 25 TAB at 08:53

## 2018-01-24 RX ADMIN — HYDROCODONE BITARTRATE AND IBUPROFEN PRN TAB: 7.5; 2 TABLET ORAL at 04:50

## 2018-01-24 NOTE — HHI.DS
Discharge Summary


Admission Date


Jan 22, 2018 at 05:32


Discharge Date:  Jan 24, 2018


Admitting Diagnosis


Osteoarthritic degeneration both left and right knees.


Diagnosis:  


(1) Status post total bilateral knee replacement


Diagnosis:  Principal


ICD Codes:  Z96.653 - Presence of artificial knee joint, bilateral


Brief History


This is a 67 year old male patient


CBC/BMP:  


1/23/18 0617





Significant Findings





Laboratory Tests








Test


  1/23/18


06:17


 


Hemoglobin


  12.3 GM/DL


(13.0-17.0)


 


Hematocrit


  35.3 %


(39.0-51.0)








PE at Discharge


In benefit moment. NV intact to toes. No calf tenderness.


Hospital Course


Patient underwent bilateral total knee arthroplasties on day of admission.  He 

received a course of prophylactic IV antibiotics and within 23 hours started on 

anticoagulation therapy.  He continued to improve tolerating food and fluid 

well and began out of bed with physical therapy tolerating by mouth pain meds.  

He continued to improve with daily wound care and was discharged on second 

postoperative day to [facility] for continuation of physical 

therapy.  He was discharged in good condition.


Pt Condition on Discharge:  Good


Discharge Disposition:  Rehab Inpatient


Discharge Instructions


Diet Instructions:  As Tolerated, No Restrictions


Activities You Can Perform:  Full Weight Bearing, Shower Only-No Bath


Activities to Avoid:  Bathing, Driving











JEVON Louise MD Jan 24, 2018 08:09

## 2018-01-24 NOTE — PD.ORT.PN
Subjective


Subjective Remarks


Patient fairly comfortable today.





Objective


Vitals





Vital Signs








  Date Time  Temp Pulse Resp B/P (MAP) Pulse Ox O2 Delivery O2 Flow Rate FiO2


 


1/24/18 04:35 98.8 88 17 134/78 (96) 100   


 


1/24/18 00:45 99.6 102 18 136/81 (99) 97   


 


1/23/18 20:40 99.6 102 19 137/79 (98) 93   


 


1/23/18 20:40        21


 


1/23/18 16:22 98.9 98 18 160/77 (104) 98   


 


1/23/18 11:48 98.8 85 17 129/59 (82) 96   


 


1/23/18 09:50     93   21














I/O      


 


 1/23/18 1/23/18 1/23/18 1/24/18 1/24/18 1/24/18





 07:00 15:00 23:00 07:00 15:00 23:00


 


Intake Total 1667 ml 600 ml 240 ml 360 ml  


 


Output Total 600 ml 450 ml    


 


Balance 1067 ml 150 ml 240 ml 360 ml  


 


      


 


Intake Oral 240 ml 600 ml 240 ml 360 ml  


 


IV Total 1427 ml     


 


Output Urine Total 600 ml 450 ml    


 


# Voids   1 3  


 


# Bowel Movements 0 0 0 0  








Result Diagram:  


1/23/18 0617





Objective Remarks


In benefit moment. NV intact to toes. No calf tenderness.





Assessment & Plan


Ortho Post Op Day #:  2


Problem List:  


Assessment and Plan


Cont PT, [facility] today.











JEVON Louise MD Jan 24, 2018 08:06